# Patient Record
(demographics unavailable — no encounter records)

---

## 2025-07-24 NOTE — REASON FOR VISIT
[FreeTextEntry1] : Mr. Mayer presents to the office today for a follow up visit.  At the patient's request, his daughter Alisha (who was present at the office visit today; cell [phone]) provided translation from Togolese.

## 2025-07-24 NOTE — HISTORY OF PRESENT ILLNESS
[FreeTextEntry1] : Mr. Mayer presents to the office today for a follow up visit.  At the patient's request, his daughter Alisha (who was present at the office visit today; cell [phone]) provided translation from [place].  Mr. Mayer is a 58 year old gentleman with a medical history significant for known coronary artery disease (see below), hypertension, hyperlipidemia, and pre-diabetes mellitus.  Mr. Mayer was originally diagnosed with coronary artery disease in June 2024 when he presented to [facility] with a six month history of progressive non-radiating exertional chest pressure that was associated with dyspnea and diaphoresis.  On the day of his presentation to the Emergency Department at [facility], the chest pain was noted to be near constant.  Mr. Mayer was initially seen at his primary care physician's office but was referred to the ER at [facility].  At [facility], Mr. aMyer was found to have an elevated serum troponin and a 12-lead electrocardiogram notable for T wave inversions in leads III and aVF as well as Q waves in lead III.  He was treated with aspirin, Ticagrelor, and intravenous heparin.  Mr. Mayer was then transferred to [facility].  Mr. Mayer had a transthoracic echocardiogram performed at [facility] (6/18/2024).  In summary, this demonstrated normal left ventricular systolic function (LVEF 57%).  Regional wall motion abnormalities were noted, with hypokinesis of the basal and mid anterolateral wall, basal and mid inferior wall, basal and mid inferolateral wall, and mid anteroseptum.  Right ventricular size and systolic function were normal.  There was no significant valvular disease.  Mr. Mayer subsequently underwent cardiac catheterization and coronary angiography on 6/18/2024 at [facility].  Vascular access was via the right radial artery.  Coronary angiography demonstrated that the coronary circulation is right dominant.  The left main coronary artery contained moderate atherosclerosis and a 50% stenosis.  Intravascular ultrasound was performed and imaging demonstrated a cross sectional area of 5.0 mm2, consistent with significant stenosis.  There was severe atherosclerosis in the proximal left anterior descending coronary artery, with a 70% stenosis noted.  The first diagonal branch of the LAD contained a 95% stenosis.  There were minor luminal irregularities in the left circumflex coronary artery.  The right coronary artery was 100% occluded in its mid aspect.  Left-sided collaterals were noted.  At the time of cardiac catheterization, Mr. Mayer underwent successful deployment of a 4.0 mm X 8.0 mm [place] RX drug eluting stent into the left main coronary artery.  A 3.5 mm X 38 mm [place] RX drug eluting stent was successfully implanted at the site of 70% stenosis in the proximal left anterior descending coronary artery.  In addition, a [place] RX 2.5 mm X 34 mm drug eluting stent was deployed at the site of the 95% stenosis in the first diagonal branch of the LAD.  Mr. Mayer reports that he has been feeling generally well since his last office visit.  However, he does endorse experiencing episodic right sided chest discomfort that only occurs when he picks up heavy objects.  The discomfort usually lasts for a few seconds at a time before resolving spontaneously.  The chest pain does not occur with walking or other physical exertion.  Mr. Mayer reports that the chest discomfort is not at all like the angina that he experienced in June 2024 at the time he was diagnosed with coronary artery disease.  Mr. Mayer reports that he is able to walk without difficulty or limitation.  Mr. Mayer works in [employer], during which he exerts himself frequently.  He does not report any chest pain or other symptoms while at work.  He has had no dyspnea either at rest or with exertion.  Mr. Mayer denies having any palpitations, presyncope, or syncope.  There has been no orthopnea, paroxysmal nocturnal dyspnea, or edema.  Mr. Mayer has been compliant with his medications.

## 2025-07-24 NOTE — DISCUSSION/SUMMARY
[FreeTextEntry1] : In summary, Mr. Mayer appears to be doing well from a cardiac standpoint.  He has been able to maintain a good functional capacity and has not had any recent symptoms suggestive of recurrent angina.  The right sided chest discomfort that occurs with lifting is most consistent with musculoskeletal symptoms.    Mr. Mayer's blood pressure is under good control on his current medical regimen.  Follow up lab work performed today in the office demonstrated satisfactory results (he does, however, have evidence of pre-diabetes).  Mr. Mayer was advised to continue with his current medical regimen, including aspirin 81 mg/day, atorvastatin 80 mg/day, clopidogrel 75 mg/day, losartan 25 mg/day, and metoprolol succinate ER 50 mg/day.  Mr. Mayer will return to the office for follow up in approximately six months, or sooner as necessary.